# Patient Record
Sex: FEMALE | Race: WHITE
[De-identification: names, ages, dates, MRNs, and addresses within clinical notes are randomized per-mention and may not be internally consistent; named-entity substitution may affect disease eponyms.]

---

## 2018-05-19 ENCOUNTER — HOSPITAL ENCOUNTER (OUTPATIENT)
Dept: HOSPITAL 76 - DI | Age: 23
Discharge: HOME | End: 2018-05-19
Attending: STUDENT IN AN ORGANIZED HEALTH CARE EDUCATION/TRAINING PROGRAM
Payer: COMMERCIAL

## 2018-05-19 DIAGNOSIS — M25.561: Primary | ICD-10-CM

## 2018-05-19 NOTE — MRI REPORT
EXAM:

RIGHT KNEE MRI WITHOUT CONTRAST

 

EXAM DATE: 5/19/2018 10:21 AM.

 

CLINICAL HISTORY: Pain in right knee.

 

COMPARISON: None.

 

TECHNIQUE: Multiplanar, multisequence T1-weighted and fluid-sensitive sequences of the knee without c
ontrast. Other: None.

 

FINDINGS: 

Bones: Surgical anchors at the medial patella. Screw fixation tunnel at the medial femoral condyle. 

 

Articular Cartilage: Unremarkable.

 

Medial Meniscus: The medial meniscus is intact.

 

Lateral Meniscus: The lateral meniscus is intact.

 

Cruciate Ligaments: The anterior and posterior cruciate ligaments are intact.

 

Collateral Ligaments: The medial collateral and lateral collateral ligamentous structures are intact.


 

Tendons: The quadriceps, patellar, semimembranosus, and popliteus tendons are unremarkable.

 

Musculature: No edema or fatty atrophy.

 

Other: Small fluid collection in the lateral patellar recess. No popliteal cyst. No loose bodies.  Pr
obable tear and/or postsurgical changes of the distal medial patellar retinaculum with lateral patell
ar subluxation. The subcutaneous tissues and fat pads are unremarkable.

 

IMPRESSION: 

1. Negative for a meniscus tear or internal derangement. 

2. Surgical anchors at the medial patellar facet and lateral patellar subluxation. Indistinct distal 
medial patellar retinaculum consistent with postsurgical changes and/or chronic partial tear.

 

[facility] SECTION

Referring Provider Line: [phone]

 

SITE ID: [id number]

## 2019-06-08 ENCOUNTER — HOSPITAL ENCOUNTER (OUTPATIENT)
Dept: HOSPITAL 76 - DI | Age: 24
Discharge: HOME | End: 2019-06-08
Attending: GENERAL PRACTICE
Payer: COMMERCIAL

## 2019-06-08 DIAGNOSIS — S72.492A: ICD-10-CM

## 2019-06-08 DIAGNOSIS — S72.422A: Primary | ICD-10-CM

## 2019-06-08 DIAGNOSIS — S83.012A: ICD-10-CM

## 2019-06-08 DIAGNOSIS — S83.412A: ICD-10-CM

## 2019-06-10 NOTE — MRI REPORT
Reason:  PAIN IN LEFT KNEE

Procedure Date:  06/08/2019   

Accession Number:  [id number] / [id number]                    

Procedure:  MRI - Knee LT W/O CPT Code:  

 

FULL RESULT:

 

 

EXAM:

LEFT KNEE MRI WITHOUT CONTRAST

 

EXAM DATE: 6/8/2019 07:32 AM.

 

CLINICAL HISTORY: Left knee pain after trampoline related injury.

 

COMPARISON: None.

 

TECHNIQUE: Multiplanar, multisequence T1-weighted and fluid-sensitive 

sequences of the knee without contrast. Other: None.

 

FINDINGS:

Bones and articular cartilage: There is marrow edema and slight cortical 

irregularity at the lateral aspect of the lateral femoral condyle 

suggestive of bone contusion and a minimally depressed impaction type 

fracture. There is marrow edema and an irregular, somewhat linear T1 

hypointense and T2 hyperintense focus at the lateral aspect of the distal 

femoral metaphysis suspicious for a nondisplaced trabecular fracture and 

bone contusion. There is a 0.7 cm hypointense lesion at the posterior 

aspect of the proximal tibial metaphysis which most likely represents a 

bone island. Smaller bone island at the medial femoral condyle. Articular 

cartilage is within normal limits. The patella is subluxed slightly 

laterally by approximately 2.5 mm. The lateral trochlear inclination 

angle is approximately 7 degrees which is below normal. The trochlear 

groove-tibial tubercle distance is approximately 1.7 cm. Patella bindu is 

present. Slight lateral patellar tilt.

 

Medial Meniscus: The medial meniscus is intact.

 

Lateral Meniscus: The lateral meniscus is intact.

 

Cruciate Ligaments: The anterior and posterior cruciate ligaments are 

intact.

 

Collateral Ligaments: Grade 1 sprain of the superficial medial collateral 

ligament and the medial patellofemoral ligament. The lateral collateral 

ligament complex structures are intact.

 

Tendons: The quadriceps, patellar, semimembranosus, and popliteus tendons 

are unremarkable.

 

Musculature: No edema or fatty atrophy.

 

Other: No effusion. No popliteal cyst. No loose bodies.  The medial and 

lateral retinacula are intact. The subcutaneous tissues and fat pads are 

unremarkable.

IMPRESSION:

1. Bone contusion and a minimally depressed impaction type fracture at 

the lateral aspect of the lateral femoral condyle which may be due to 

recent transient patellar dislocation injury. There is also bone 

contusion and a nondisplaced trabecular fracture at the lateral aspect of 

the distal femoral metaphysis.

2. Lateral subluxation of the patella by approximately 2.5 mm. Slight 

lateral patellar tilt. Patella bindu is also present.

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

4. Grade 1 sprain of the superficial medial collateral ligament and the 

patellofemoral ligament.

5. No meniscal tear.

 

[facility]